# Patient Record
Sex: FEMALE | Race: WHITE | NOT HISPANIC OR LATINO | URBAN - METROPOLITAN AREA
[De-identification: names, ages, dates, MRNs, and addresses within clinical notes are randomized per-mention and may not be internally consistent; named-entity substitution may affect disease eponyms.]

---

## 2024-04-18 ENCOUNTER — APPOINTMENT (RX ONLY)
Dept: URBAN - METROPOLITAN AREA CLINIC 85 | Facility: CLINIC | Age: 69
Setting detail: DERMATOLOGY
End: 2024-04-18

## 2024-04-18 DIAGNOSIS — I87.2 VENOUS INSUFFICIENCY (CHRONIC) (PERIPHERAL): ICD-10-CM | Status: INADEQUATELY CONTROLLED

## 2024-04-18 PROCEDURE — ? MEDICAL CONSULTATION: VENOUS DISEASE

## 2024-04-18 PROCEDURE — 99203 OFFICE O/P NEW LOW 30 MIN: CPT

## 2024-04-18 NOTE — PROCEDURE: MEDICAL CONSULTATION: VENOUS DISEASE
Left Leg Compression Therapy: 1- Intermittent use of stockings
Right Leg Varicose Veins: 1- Few, scattered: branch varicose veins
Right Leg Ulcer Duration: 0- None
Left Leg Circumference: medium
Clinical Classification Set 2: C0 - no visible or palpable signs of venous disease
Anatomy Set 2: As - Superficial Veins
Left Dorsalis Pedis Pulse: 2 (Easily palpable)
Right Leg: Peripheral Vascular Disease?: No
Etiology Set 1: Ec - Congenital
Pathophysiology Set 1: Pr - Reflux
Include Vcss In The Note?: Yes
Left Leg Venous Hyperpigmentation: 0- None or focal low intensity (tan)
Right Leg Pain: 1- Occasional, not restricting activity or requiring analgesics
Detail Level: Detailed
Length Of Time Symptoms Present (Include Units): 10 years

## 2024-04-18 NOTE — HPI: VARICOSE VEINS (VARICOSITIES)
How Severe Are They?: mild
Is This A New Presentation, Or A Follow-Up?: Varicose Veins
Additional History: Patients main concern is spider veins on her surface, has had previous ablation in the Left leg and Sclerotherapy in bilateral legs. \\nPatient declines to schedule ultrasound at this time [place]. Will call to schedule.